# Patient Record
(demographics unavailable — no encounter records)

---

## 2024-10-10 NOTE — HISTORY OF PRESENT ILLNESS
[de-identified] : MYA AJ is a 63 year male 4 months s/p right THR. He reports continuing a home exercise routine. He reports having a constant "burning" pain along his thigh which radiates to his right knee. He reports having relief from working out. He uses 100 pounds on the leg press. He has returned to daily activities of life without significant pain or discomfort otherwise. Overall, he is very happy with the result of the surgery.

## 2024-10-10 NOTE — DISCUSSION/SUMMARY
[de-identified] : The patient is doing very well 3 months following right total hip replacement. The patient will continue their home exercises. 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 Overall he is making excellent progress and is very happy with the result so far. Dental prophylaxis was reviewed. Follow up one year post op for radiographic surveillance.

## 2024-10-10 NOTE — PHYSICAL EXAM
[de-identified] :  The patient appears well nourished and in no apparent distress. The patient is alert and oriented to person, place, and time. Affect and mood appear normal. The head is normocephalic and atraumatic. The eyes reveal normal sclera and extra ocular muscles are intact. The tongue is midline with no apparent lesions. Skin shows normal turgor with no evidence of eczema or psoriasis. No respiratory distress noted. Sensation grossly intact. [de-identified] :  Exam of the right hip shows a well healed incision, hip flexion of 110 degrees, hip external rotation of 50 degrees, hip internal rotation of 25-30 degrees. Numbness along the femoral nerve distribution. 5/5 motor strength bilaterally distally. Sensation intact distally. [de-identified] : X-ray: AP of the pelvis and 2 views of the right hip demonstrate a right total hip arthroplasty in stable position, with no evidence of fracture, loosening, or dislocation.

## 2024-10-10 NOTE — REASON FOR VISIT
[Follow-Up Visit] : a follow-up visit for [Other: ____] : [unfilled] [FreeTextEntry2] : S/P Right robotic-assisted anterior total hip replacement with JORDYN: DOS: 06/19/24. S/P Right TKR, removal of hardware, Right tibia; DOS: 03/29/17.